# Patient Record
Sex: MALE | Race: WHITE | NOT HISPANIC OR LATINO | Employment: STUDENT | URBAN - METROPOLITAN AREA
[De-identification: names, ages, dates, MRNs, and addresses within clinical notes are randomized per-mention and may not be internally consistent; named-entity substitution may affect disease eponyms.]

---

## 2017-03-03 ENCOUNTER — TELEPHONE (OUTPATIENT)
Dept: ORTHOPEDICS | Facility: CLINIC | Age: 32
End: 2017-03-03

## 2017-03-03 ENCOUNTER — OFFICE VISIT (OUTPATIENT)
Dept: ORTHOPEDICS | Facility: CLINIC | Age: 32
End: 2017-03-03
Payer: COMMERCIAL

## 2017-03-03 ENCOUNTER — HOSPITAL ENCOUNTER (OUTPATIENT)
Dept: RADIOLOGY | Facility: HOSPITAL | Age: 32
Discharge: HOME | End: 2017-03-03
Attending: NURSE PRACTITIONER
Payer: COMMERCIAL

## 2017-03-03 VITALS
HEIGHT: 68 IN | WEIGHT: 195 LBS | SYSTOLIC BLOOD PRESSURE: 106 MMHG | BODY MASS INDEX: 29.55 KG/M2 | DIASTOLIC BLOOD PRESSURE: 70 MMHG | HEART RATE: 76 BPM

## 2017-03-03 DIAGNOSIS — M25.572 ACUTE LEFT ANKLE PAIN: Primary | ICD-10-CM

## 2017-03-03 DIAGNOSIS — S82.832A CLOSED FRACTURE OF DISTAL END OF LEFT FIBULA, UNSPECIFIED FRACTURE MORPHOLOGY, INITIAL ENCOUNTER: ICD-10-CM

## 2017-03-03 DIAGNOSIS — M25.572 ACUTE LEFT ANKLE PAIN: ICD-10-CM

## 2017-03-03 PROCEDURE — 1160F RVW MEDS BY RX/DR IN RCRD: CPT | Mod: S$GLB,,, | Performed by: NURSE PRACTITIONER

## 2017-03-03 PROCEDURE — 99999 PR PBB SHADOW E&M-EST. PATIENT-LVL III: CPT | Mod: PBBFAC,,, | Performed by: NURSE PRACTITIONER

## 2017-03-03 PROCEDURE — 73610 X-RAY EXAM OF ANKLE: CPT | Mod: TC,LT

## 2017-03-03 PROCEDURE — 73610 X-RAY EXAM OF ANKLE: CPT | Mod: 26,LT,, | Performed by: RADIOLOGY

## 2017-03-03 PROCEDURE — 99203 OFFICE O/P NEW LOW 30 MIN: CPT | Mod: S$GLB,,, | Performed by: NURSE PRACTITIONER

## 2017-03-03 RX ORDER — HYDROCODONE BITARTRATE AND ACETAMINOPHEN 5; 325 MG/1; MG/1
1 TABLET ORAL EVERY 6 HOURS PRN
Qty: 30 TABLET | Refills: 0 | Status: SHIPPED | OUTPATIENT
Start: 2017-03-03 | End: 2017-03-17 | Stop reason: SINTOL

## 2017-03-03 NOTE — TELEPHONE ENCOUNTER
Pt went to outside UC and was told he had a tib fib fx.   Pt scheduled with Penelope Mcdermott NP for evaluation and tx.   Pt has [email or web address] insurance.

## 2017-03-05 NOTE — PROGRESS NOTES
SUBJECTIVE:     Chief Complaint & History of Present Illness:  Nivia Zelaya is a 31 y.o. year old male who presents for evaluation of constant bilateral ankle pain which started 2 nights ago. There is a history of trauma he states he fell around 2AM and was seen at an urgent care who told him he had an ankle fracture and placed him in a walking boot.  The pain is located in the lateral aspect of the ankle.  The pain is described as sharp. He has been semi-weight bearing and using one crutch for ambulation. It is aggravated by direct pressure and walking. Denies any numbness or tingling. Associated symptoms include swelling.  There is not radiation into the foot.  Previous treatments include booting which have provided poor relief.  There is not a history of previous surgery to the ankle.  The patient does not use an assistive device.    Review of patient's allergies indicates:  No Known Allergies      Current Outpatient Prescriptions   Medication Sig Dispense Refill    finasteride (PROSCAR) 5 mg tablet TAKE ONE TABLET BY MOUTH ONCE DAILY 90 tablet 0    hydrocodone-acetaminophen 5-325mg (NORCO) 5-325 mg per tablet Take 1 tablet by mouth every 6 (six) hours as needed for Pain. 30 tablet 0     No current facility-administered medications for this visit.        History reviewed. No pertinent past medical history.    History reviewed. No pertinent surgical history.    Vital Signs (Most Recent)  Vitals:    03/03/17 1006   BP: 106/70   Pulse: 76       Review of Systems:  Review of Systems   Constitution: Negative for chills, decreased appetite, fever, weakness and malaise/fatigue.   Eyes: Negative for visual disturbance.   Cardiovascular: Negative for chest pain and palpitations.   Hematologic/Lymphatic: Negative for bleeding problem. Does not bruise/bleed easily.   Skin: Negative for color change, dry skin, flushing, itching, poor wound healing and rash.   Musculoskeletal: Positive for joint pain and joint  "swelling. Negative for arthritis, back pain, falls, gout, muscle cramps, muscle weakness, myalgias, neck pain and stiffness.   Neurological: Negative for dizziness, light-headedness, loss of balance, numbness and paresthesias.   Psychiatric/Behavioral: Negative for altered mental status.         OBJECTIVE:     PHYSICAL EXAM:  Height: 5' 8" (172.7 cm) Weight: 88.5 kg (195 lb)  General    Nursing note and vitals reviewed.  Constitutional: He is oriented to person, place, and time. He appears well-developed and well-nourished. No distress.   HENT:   Head: Atraumatic.   Nose: Nose normal.   Eyes: Conjunctivae and EOM are normal. Right eye exhibits no discharge. Left eye exhibits no discharge.   Cardiovascular: Normal rate.    Pulmonary/Chest: Effort normal. No respiratory distress.   Neurological: He is alert and oriented to person, place, and time. He has normal reflexes.   Psychiatric: He has a normal mood and affect. His behavior is normal. Judgment and thought content normal.         Left Ankle/Foot Exam     Inspection  Deformity: absent  Erythema: absent  Bruising: Ankle - absent Foot - absent  Effusion: Ankle - present Foot - absent    Pain   The patient exhibits pain of the lateral malleolus.    Alignment   Knee Alignment: neutral  Hindfoot Alignment: neutral  Midfoot Alignment: normal    Other   Sensation: normal    Comments:  Full ankle ROM not assess s/t acute fx  Pt able to move all toes normally       Vascular Exam       Left Pulses  Dorsalis Pedis:      2+  Posterior Tibial:      2+          Vitals:    03/03/17 1006   BP: 106/70   Pulse: 76       RADIOGRAPHS:  Xray of the left ankle showing oblique, nondisplaced two part distal fibula fracture    ASSESSMENT/PLAN:     Plan:  - Discussed case with Geeta Pandya distal fib fx  - Pt in short boot from urgent care D/c this and change to posterior short leg splint with stirrups  - Non weight bearing  - In 2 weeks if swelling reduced, change to short leg " cast continue non weight bearing x4 wk, then transition to tall walking boot   - Elevate  - RX written for pain medication, he states he refused at urgent care but it huts worse now, he understands he may feel fatigued on this medication and cannot drive while taking narcotic  - Pt in agreement with plan

## 2017-03-17 ENCOUNTER — OFFICE VISIT (OUTPATIENT)
Dept: ORTHOPEDICS | Facility: CLINIC | Age: 32
End: 2017-03-17
Payer: COMMERCIAL

## 2017-03-17 ENCOUNTER — HOSPITAL ENCOUNTER (OUTPATIENT)
Dept: RADIOLOGY | Facility: HOSPITAL | Age: 32
Discharge: HOME | End: 2017-03-17
Attending: NURSE PRACTITIONER
Payer: COMMERCIAL

## 2017-03-17 VITALS — BODY MASS INDEX: 29.57 KG/M2 | HEIGHT: 68 IN | WEIGHT: 195.13 LBS

## 2017-03-17 DIAGNOSIS — M25.572 LEFT ANKLE PAIN, UNSPECIFIED CHRONICITY: ICD-10-CM

## 2017-03-17 DIAGNOSIS — S82.832D CLOSED FRACTURE OF DISTAL END OF LEFT FIBULA WITH ROUTINE HEALING, UNSPECIFIED FRACTURE MORPHOLOGY, SUBSEQUENT ENCOUNTER: Primary | ICD-10-CM

## 2017-03-17 DIAGNOSIS — M25.572 LEFT ANKLE PAIN, UNSPECIFIED CHRONICITY: Primary | ICD-10-CM

## 2017-03-17 PROCEDURE — 73610 X-RAY EXAM OF ANKLE: CPT | Mod: 26,LT,, | Performed by: RADIOLOGY

## 2017-03-17 PROCEDURE — 99999 PR PBB SHADOW E&M-EST. PATIENT-LVL III: CPT | Mod: PBBFAC,,, | Performed by: NURSE PRACTITIONER

## 2017-03-17 PROCEDURE — 1160F RVW MEDS BY RX/DR IN RCRD: CPT | Mod: S$GLB,,, | Performed by: NURSE PRACTITIONER

## 2017-03-17 PROCEDURE — 99213 OFFICE O/P EST LOW 20 MIN: CPT | Mod: 25,S$GLB,, | Performed by: NURSE PRACTITIONER

## 2017-03-17 PROCEDURE — 29405 APPL SHORT LEG CAST: CPT | Mod: LT,S$GLB,, | Performed by: NURSE PRACTITIONER

## 2017-03-17 PROCEDURE — 73610 X-RAY EXAM OF ANKLE: CPT | Mod: TC,LT

## 2017-03-17 RX ORDER — PENICILLIN V POTASSIUM 500 MG/1
TABLET, FILM COATED ORAL
Refills: 0 | COMMUNITY
Start: 2017-03-10 | End: 2017-10-27

## 2017-03-17 RX ORDER — OXYCODONE AND ACETAMINOPHEN 5; 325 MG/1; MG/1
1 TABLET ORAL EVERY 6 HOURS PRN
Qty: 30 TABLET | Refills: 0 | Status: SHIPPED | OUTPATIENT
Start: 2017-03-17 | End: 2017-04-18 | Stop reason: ALTCHOICE

## 2017-03-17 NOTE — PROGRESS NOTES
SUBJECTIVE:     Chief Complaint & History of Present Illness:  Nivia Zelaya is a 31 y.o. year old male who presents for follow up of left ankle distal fib fracture 3/1/17. He was placed in posterior leg splint with stirrups at last visit after arriving to us in a short walking boot from urgent care. After splinting, he was non-weightbearing for last 2 weeks. He states he is doing well. He denies n/t, states occasional aching pain at the ankle. He asks if the Norco can cause tooth aches as he started having one soon after, is seeing a dentist and on abx. He states dentist is unsure of cause of toothache. He denies any other questions or concerns. He is taking Norco PRN.     Review of patient's allergies indicates:  No Known Allergies      Current Outpatient Prescriptions   Medication Sig Dispense Refill    finasteride (PROSCAR) 5 mg tablet TAKE ONE TABLET BY MOUTH ONCE DAILY 90 tablet 0    oxycodone-acetaminophen (PERCOCET) 5-325 mg per tablet Take 1 tablet by mouth every 6 (six) hours as needed for Pain. 30 tablet 0    penicillin v potassium (VEETID) 500 MG tablet TK 2 TS PO NOW THEN TK 1 T PO TID FOR DENTAL INFECTION TAKE ALL MEDICATION  0     No current facility-administered medications for this visit.        History reviewed. No pertinent past medical history.    History reviewed. No pertinent surgical history.    Vital Signs (Most Recent)  There were no vitals filed for this visit.    Review of Systems:  Review of Systems   Constitution: Negative for chills, decreased appetite, fever, weakness and malaise/fatigue.   Eyes: Negative for visual disturbance.   Cardiovascular: Negative for chest pain and palpitations.   Hematologic/Lymphatic: Negative for bleeding problem. Does not bruise/bleed easily.   Skin: Negative for color change, dry skin, flushing, itching, poor wound healing and rash.   Musculoskeletal: Positive for joint pain and joint swelling. Negative for arthritis, back pain, falls, gout,  "muscle cramps, muscle weakness, myalgias, neck pain and stiffness.   Neurological: Negative for dizziness, light-headedness, loss of balance, numbness and paresthesias.   Psychiatric/Behavioral: Negative for altered mental status.         OBJECTIVE:     PHYSICAL EXAM:  Height: 5' 8" (172.7 cm) Weight: 88.5 kg (195 lb 1.7 oz)  General    Nursing note and vitals reviewed.  Constitutional: He is oriented to person, place, and time. He appears well-developed and well-nourished. No distress.   HENT:   Head: Atraumatic.   Nose: Nose normal.   Eyes: Conjunctivae and EOM are normal. Right eye exhibits no discharge. Left eye exhibits no discharge.   Cardiovascular: Normal rate.    Pulmonary/Chest: Effort normal. No respiratory distress.   Neurological: He is alert and oriented to person, place, and time. He has normal reflexes.   Psychiatric: He has a normal mood and affect. His behavior is normal. Judgment and thought content normal.         Left Ankle/Foot Exam     Inspection  Deformity: absent  Erythema: absent  Bruising: Ankle - absent Foot - absent  Effusion: Ankle - present (Mild swelling, improved since last visit ) Foot - absent    Pain   The patient exhibits pain of the lateral malleolus.    Alignment   Knee Alignment: neutral  Hindfoot Alignment: neutral  Midfoot Alignment: normal    Other   Sensation: normal    Comments:  Full ankle ROM not assess s/t acute fx  Pt able to move all toes normally       Vascular Exam       Left Pulses  Dorsalis Pedis:      2+  Posterior Tibial:      2+          There were no vitals filed for this visit.    RADIOGRAPHS:  Xray of the left ankle showing healing oblique two part distal fibula fracture, stable alignment compared to previous film    ASSESSMENT/PLAN:     Plan:  - Discussed case with Geeta Pandya distal fib fx  - Splint removed. After no change in alignment confirmed on Xray, patient was placed in a short leg cast  - Short leg cast continue non weight bearing x4 wk, " then transition to tall walking boot. Pt states cast feels good, denies any loose or tight feeling. He is to call if he experienced any tightness or loosening feeling, cast gets wet, etc. Pt verb understanding of this.   - Rest, Elevate  - Norco changed to Perocet, I told him I have not experienced a patient having toothache with norco but we can change medication to see if this makes a difference. Patient would like to try this.   - Pt in agreement with plan

## 2017-04-17 ENCOUNTER — HOSPITAL ENCOUNTER (OUTPATIENT)
Dept: RADIOLOGY | Facility: HOSPITAL | Age: 32
Discharge: HOME | End: 2017-04-17
Attending: NURSE PRACTITIONER
Payer: COMMERCIAL

## 2017-04-17 ENCOUNTER — OFFICE VISIT (OUTPATIENT)
Dept: ORTHOPEDICS | Facility: CLINIC | Age: 32
End: 2017-04-17
Payer: COMMERCIAL

## 2017-04-17 VITALS — BODY MASS INDEX: 29.57 KG/M2 | HEIGHT: 68 IN | WEIGHT: 195.13 LBS

## 2017-04-17 DIAGNOSIS — M25.572 LEFT ANKLE PAIN, UNSPECIFIED CHRONICITY: ICD-10-CM

## 2017-04-17 DIAGNOSIS — S82.832D CLOSED FRACTURE OF DISTAL END OF LEFT FIBULA WITH ROUTINE HEALING, UNSPECIFIED FRACTURE MORPHOLOGY, SUBSEQUENT ENCOUNTER: Primary | ICD-10-CM

## 2017-04-17 DIAGNOSIS — M25.572 LEFT ANKLE PAIN, UNSPECIFIED CHRONICITY: Primary | ICD-10-CM

## 2017-04-17 PROCEDURE — 1160F RVW MEDS BY RX/DR IN RCRD: CPT | Mod: S$GLB,,, | Performed by: NURSE PRACTITIONER

## 2017-04-17 PROCEDURE — 99999 PR PBB SHADOW E&M-EST. PATIENT-LVL II: CPT | Mod: PBBFAC,,, | Performed by: NURSE PRACTITIONER

## 2017-04-17 PROCEDURE — 99213 OFFICE O/P EST LOW 20 MIN: CPT | Mod: S$GLB,,, | Performed by: NURSE PRACTITIONER

## 2017-04-17 PROCEDURE — 73610 X-RAY EXAM OF ANKLE: CPT | Mod: 26,LT,, | Performed by: RADIOLOGY

## 2017-04-17 PROCEDURE — 73610 X-RAY EXAM OF ANKLE: CPT | Mod: TC,LT

## 2017-04-18 NOTE — PROGRESS NOTES
"  SUBJECTIVE:     Chief Complaint & History of Present Illness:  Nivia Zelaya is a 31 y.o. year old male who presents for f/u following 3/1 fall resulting in a distal fib fracture. He was splinted x2 weeks, casted [id number] and is present today for eval. He has been non weight bearing. He states he feels great. Denies any pain. Denies numbness or tingling. Denies any other concerns or complaints.     Review of patient's allergies indicates:  No Known Allergies      Current Outpatient Prescriptions   Medication Sig Dispense Refill    finasteride (PROSCAR) 5 mg tablet TAKE ONE TABLET BY MOUTH ONCE DAILY 90 tablet 0    oxycodone-acetaminophen (PERCOCET) 5-325 mg per tablet Take 1 tablet by mouth every 6 (six) hours as needed for Pain. 30 tablet 0    penicillin v potassium (VEETID) 500 MG tablet TK 2 TS PO NOW THEN TK 1 T PO TID FOR DENTAL INFECTION TAKE ALL MEDICATION  0     No current facility-administered medications for this visit.        History reviewed. No pertinent past medical history.    History reviewed. No pertinent surgical history.    Vital Signs (Most Recent)  There were no vitals filed for this visit.    Review of Systems:  Review of Systems   Constitution: Negative for chills, decreased appetite, fever, weakness and malaise/fatigue.   Eyes: Negative for visual disturbance.   Cardiovascular: Negative for chest pain and palpitations.   Hematologic/Lymphatic: Negative for bleeding problem. Does not bruise/bleed easily.   Skin: Negative for color change, dry skin, flushing, itching, poor wound healing and rash.   Musculoskeletal: Negative for arthritis, back pain, falls, gout, joint pain, joint swelling, muscle cramps, muscle weakness, myalgias, neck pain and stiffness.   Neurological: Negative for dizziness, light-headedness, loss of balance, numbness and paresthesias.   Psychiatric/Behavioral: Negative for altered mental status.         OBJECTIVE:     PHYSICAL EXAM:  Height: 5' 8" (172.7 cm) Weight: " 88.5 kg (195 lb 1.7 oz)  General    Nursing note and vitals reviewed.  Constitutional: He is oriented to person, place, and time. He appears well-developed and well-nourished. No distress.   HENT:   Head: Atraumatic.   Nose: Nose normal.   Eyes: Conjunctivae and EOM are normal. Right eye exhibits no discharge. Left eye exhibits no discharge.   Cardiovascular: Normal rate.    Pulmonary/Chest: Effort normal. No respiratory distress.   Neurological: He is alert and oriented to person, place, and time. He has normal reflexes.   Psychiatric: He has a normal mood and affect. His behavior is normal. Judgment and thought content normal.         Left Ankle/Foot Exam     Inspection  Deformity: absent  Erythema: absent  Bruising: Ankle - absent Foot - absent  Effusion: Ankle - absent Foot - absent    Alignment   Knee Alignment: neutral  Hindfoot Alignment: neutral  Midfoot Alignment: normal    Other   Sensation: normal    Comments:  No pain to palpation of fracture site  ROM mildly limited s/t to stiffness   No pain with ROM  No swelling  No skin breakdown       Vascular Exam       Left Pulses  Dorsalis Pedis:      2+  Posterior Tibial:      2+          RADIOGRAPHS:  Xray of the left ankle showing  a healing oblique fracture of the lateral malleolus.  Alignment is anatomic.  Ankle mortise is symmetric.  Talar dome is maintained.    ASSESSMENT/PLAN:     Plan:  - Connor B distal fib fx  - Cast removed today, swelling resolved, no pain to fracture site  - Placed in tall CAM boot  - Start 2x toe touch WB, progress to WBAT  - Start gentle ROM as tolerated, he does have joint stiffness s/t to 6wk immobilization   - RTC 4 wk with new Xray

## 2017-05-15 ENCOUNTER — OFFICE VISIT (OUTPATIENT)
Dept: ORTHOPEDICS | Facility: CLINIC | Age: 32
End: 2017-05-15
Payer: COMMERCIAL

## 2017-05-15 ENCOUNTER — HOSPITAL ENCOUNTER (OUTPATIENT)
Dept: RADIOLOGY | Facility: HOSPITAL | Age: 32
Discharge: HOME | End: 2017-05-15
Attending: NURSE PRACTITIONER
Payer: COMMERCIAL

## 2017-05-15 VITALS — BODY MASS INDEX: 29.57 KG/M2 | WEIGHT: 195.13 LBS | HEIGHT: 68 IN

## 2017-05-15 DIAGNOSIS — S82.832D CLOSED FRACTURE OF DISTAL END OF LEFT FIBULA WITH ROUTINE HEALING, UNSPECIFIED FRACTURE MORPHOLOGY, SUBSEQUENT ENCOUNTER: Primary | ICD-10-CM

## 2017-05-15 DIAGNOSIS — S82.832D CLOSED FRACTURE OF DISTAL END OF LEFT FIBULA WITH ROUTINE HEALING, UNSPECIFIED FRACTURE MORPHOLOGY, SUBSEQUENT ENCOUNTER: ICD-10-CM

## 2017-05-15 PROCEDURE — 73610 X-RAY EXAM OF ANKLE: CPT | Mod: 26,LT,, | Performed by: RADIOLOGY

## 2017-05-15 PROCEDURE — 1160F RVW MEDS BY RX/DR IN RCRD: CPT | Mod: S$GLB,,, | Performed by: NURSE PRACTITIONER

## 2017-05-15 PROCEDURE — 73610 X-RAY EXAM OF ANKLE: CPT | Mod: TC,LT

## 2017-05-15 PROCEDURE — 99999 PR PBB SHADOW E&M-EST. PATIENT-LVL III: CPT | Mod: PBBFAC,,, | Performed by: NURSE PRACTITIONER

## 2017-05-15 PROCEDURE — 99213 OFFICE O/P EST LOW 20 MIN: CPT | Mod: S$GLB,,, | Performed by: NURSE PRACTITIONER

## 2017-05-15 RX ORDER — IBUPROFEN 800 MG/1
TABLET ORAL
Refills: 0 | COMMUNITY
Start: 2017-05-04 | End: 2017-10-27

## 2017-05-15 NOTE — PROGRESS NOTES
SUBJECTIVE:     Chief Complaint & History of Present Illness:  Nivia Zelaya is a 31 y.o. year old male who presents for f/u following 3//2017 fall resulting in a distal fib fracture. He was splinted x2 weeks, casted [id number], CAM boot x 4 wk and is present today for follow up. He has been full weight bearing in his boot. He states he feels great. States occasionally he feels a slight pain, but nothing bothersome. Denies numbness or tingling. Denies any other concerns or complaints. He denies any pain with ambulation in the CAM boot. He states he has worn it whenever he is walking, but not sleeping.     Review of patient's allergies indicates:  No Known Allergies      Current Outpatient Prescriptions   Medication Sig Dispense Refill    finasteride (PROSCAR) 5 mg tablet TAKE ONE TABLET BY MOUTH ONCE DAILY 90 tablet 0    penicillin v potassium (VEETID) 500 MG tablet TK 2 TS PO NOW THEN TK 1 T PO TID FOR DENTAL INFECTION TAKE ALL MEDICATION  0    ibuprofen (ADVIL,MOTRIN) 800 MG tablet TK 1 T PO Q 8 H PRN P  0     No current facility-administered medications for this visit.        History reviewed. No pertinent past medical history.    History reviewed. No pertinent surgical history.    Vital Signs (Most Recent)  There were no vitals filed for this visit.    Review of Systems:  Review of Systems   Constitution: Negative for chills, decreased appetite, fever, weakness and malaise/fatigue.   Eyes: Negative for visual disturbance.   Cardiovascular: Negative for chest pain and palpitations.   Hematologic/Lymphatic: Negative for bleeding problem. Does not bruise/bleed easily.   Skin: Negative for color change, dry skin, flushing, itching, poor wound healing and rash.   Musculoskeletal: Negative for arthritis, back pain, falls, gout, joint pain, joint swelling, muscle cramps, muscle weakness, myalgias, neck pain and stiffness.   Neurological: Negative for dizziness, light-headedness, loss of balance, numbness and  "paresthesias.   Psychiatric/Behavioral: Negative for altered mental status.         OBJECTIVE:     PHYSICAL EXAM:  Height: 5' 8" (172.7 cm) Weight: 88.5 kg (195 lb 1.7 oz)  General    Nursing note and vitals reviewed.  Constitutional: He is oriented to person, place, and time. He appears well-developed and well-nourished. No distress.   HENT:   Head: Atraumatic.   Nose: Nose normal.   Eyes: Conjunctivae and EOM are normal. Right eye exhibits no discharge. Left eye exhibits no discharge.   Cardiovascular: Normal rate.    Pulmonary/Chest: Effort normal. No respiratory distress.   Neurological: He is alert and oriented to person, place, and time. He has normal reflexes.   Psychiatric: He has a normal mood and affect. His behavior is normal. Judgment and thought content normal.         Left Ankle/Foot Exam     Inspection  Deformity: absent  Erythema: absent  Bruising: Ankle - absent Foot - absent  Effusion: Ankle - absent Foot - absent    Alignment   Knee Alignment: neutral  Hindfoot Alignment: neutral  Midfoot Alignment: normal    Muscle Strength   The patient has normal left ankle strength.    Other   Sensation: normal    Comments:  No pain to palpation of fracture site  ROM mildly limited s/t to stiffness   No pain with ROM  No swelling  No skin breakdown       Vascular Exam       Left Pulses  Dorsalis Pedis:      2+  Posterior Tibial:      2+          RADIOGRAPHS:  Xray of the left ankle showing  a healing oblique fracture of the lateral malleolus.  Alignment is anatomic.  Ankle mortise is symmetric.  Talar dome is maintained.    ASSESSMENT/PLAN:     Plan:  - Geeta ROJAS distal fib fx  - Pt progressing well, no pain to fracture site  - Pt may start to wean out of CAM boot  - Fitted for lace up ankle brace today, must wear with ambulation/standing x2 more weeks minimum  - Continue ROM of the ankle, discussed PT with patient, he states he does not or feel that he needs at this point  - RTC 6 wk with new Xray   - No pain " medication needed  - Pt will call clinic with any questions, concerns, new symptoms, etc.

## 2017-06-26 ENCOUNTER — HOSPITAL ENCOUNTER (OUTPATIENT)
Dept: RADIOLOGY | Facility: HOSPITAL | Age: 32
Discharge: HOME | End: 2017-06-26
Attending: NURSE PRACTITIONER
Payer: COMMERCIAL

## 2017-06-26 ENCOUNTER — OFFICE VISIT (OUTPATIENT)
Dept: ORTHOPEDICS | Facility: CLINIC | Age: 32
End: 2017-06-26
Payer: COMMERCIAL

## 2017-06-26 VITALS — HEIGHT: 68 IN | BODY MASS INDEX: 29.57 KG/M2 | WEIGHT: 195.13 LBS

## 2017-06-26 DIAGNOSIS — S82.832D CLOSED FRACTURE OF DISTAL END OF LEFT FIBULA WITH ROUTINE HEALING, UNSPECIFIED FRACTURE MORPHOLOGY, SUBSEQUENT ENCOUNTER: Primary | ICD-10-CM

## 2017-06-26 DIAGNOSIS — M25.572 LEFT ANKLE PAIN, UNSPECIFIED CHRONICITY: Primary | ICD-10-CM

## 2017-06-26 DIAGNOSIS — M25.572 LEFT ANKLE PAIN, UNSPECIFIED CHRONICITY: ICD-10-CM

## 2017-06-26 PROCEDURE — 99213 OFFICE O/P EST LOW 20 MIN: CPT | Mod: S$GLB,,, | Performed by: NURSE PRACTITIONER

## 2017-06-26 PROCEDURE — 73610 X-RAY EXAM OF ANKLE: CPT | Mod: 26,LT,, | Performed by: RADIOLOGY

## 2017-06-26 PROCEDURE — 99999 PR PBB SHADOW E&M-EST. PATIENT-LVL II: CPT | Mod: PBBFAC,,, | Performed by: NURSE PRACTITIONER

## 2017-06-26 PROCEDURE — 73610 X-RAY EXAM OF ANKLE: CPT | Mod: TC,LT

## 2017-06-26 NOTE — PROGRESS NOTES
SUBJECTIVE:     Chief Complaint & History of Present Illness:  Nivia Zelaya is a 31 y.o. year old male who presents for f/u following 3/1/2017 fall resulting in a distal fib fracture. He was splinted x2 weeks, casted [id number], CAM boot x 4 wk and is present today for follow up. He has been full weight bearing in his boot. He states he feels great. States occasionally he feels a slight pain with extreme extension of the ankle, but no limitation in terms of daily living. States he does not need or take anything for pain because he doesn't really have pain. He has weaned himself out of lace up ankle brace. Denies numbness or tingling. Denies any other concerns or complaints.     Review of patient's allergies indicates:  No Known Allergies      Current Outpatient Prescriptions   Medication Sig Dispense Refill    finasteride (PROSCAR) 5 mg tablet TAKE ONE TABLET BY MOUTH ONCE DAILY 90 tablet 0    ibuprofen (ADVIL,MOTRIN) 800 MG tablet TK 1 T PO Q 8 H PRN P  0    penicillin v potassium (VEETID) 500 MG tablet TK 2 TS PO NOW THEN TK 1 T PO TID FOR DENTAL INFECTION TAKE ALL MEDICATION  0     No current facility-administered medications for this visit.        History reviewed. No pertinent past medical history.    History reviewed. No pertinent surgical history.    Vital Signs (Most Recent)  There were no vitals filed for this visit.    Review of Systems:  Review of Systems   Constitution: Negative for chills, decreased appetite, fever, weakness and malaise/fatigue.   Eyes: Negative for visual disturbance.   Cardiovascular: Negative for chest pain and palpitations.   Hematologic/Lymphatic: Negative for bleeding problem. Does not bruise/bleed easily.   Skin: Negative for color change, dry skin, flushing, itching, poor wound healing and rash.   Musculoskeletal: Positive for joint pain. Negative for arthritis, back pain, falls, gout, joint swelling, muscle cramps, muscle weakness, myalgias, neck pain and stiffness.  "  Neurological: Negative for dizziness, light-headedness, loss of balance, numbness and paresthesias.   Psychiatric/Behavioral: Negative for altered mental status.         OBJECTIVE:     PHYSICAL EXAM:  Height: 5' 8" (172.7 cm) Weight: 88.5 kg (195 lb 1.7 oz)  General    Nursing note and vitals reviewed.  Constitutional: He is oriented to person, place, and time. He appears well-developed and well-nourished. No distress.   HENT:   Head: Atraumatic.   Nose: Nose normal.   Eyes: Conjunctivae and EOM are normal. Right eye exhibits no discharge. Left eye exhibits no discharge.   Cardiovascular: Normal rate.    Pulmonary/Chest: Effort normal. No respiratory distress.   Neurological: He is alert and oriented to person, place, and time. He has normal reflexes.   Psychiatric: He has a normal mood and affect. His behavior is normal. Judgment and thought content normal.         Left Ankle/Foot Exam     Inspection  Deformity: absent  Erythema: absent  Bruising: Ankle - absent Foot - absent  Effusion: Ankle - absent Foot - absent    Range of Motion   The patient has normal left ankle ROM.     Alignment   Knee Alignment: neutral  Hindfoot Alignment: neutral  Midfoot Alignment: normal    Muscle Strength   The patient has normal left ankle strength.    Other   Sensation: normal    Comments:  No pain to palpation of fracture site  No pain with ROM  No swelling  No skin breakdown       Vascular Exam       Left Pulses  Dorsalis Pedis:      2+  Posterior Tibial:      2+          RADIOGRAPHS:  Xray of the left ankle showing  a healing oblique fracture of the lateral malleolus.  Alignment is anatomic.  Ankle mortise is symmetric.  Talar dome is maintained.    ASSESSMENT/PLAN:     Plan:  Pt almost 4m s/p distal fib fracture.   - Connor B distal fib fx  - Scant pain, none to tenderness or walking- he states only when he pushes full extension in the ankle  - Pt ROM good, he states he does not want/need any PT  - He asks if he can start " jogging. He may however I recommend lace-up ankle brace for stability, and instructed him only jogging if no pain, jogging as tolerated. If he has pain with it- I do not recommend jogging.   - RTC 12 wk with new Xray prn  - No pain medication needed  - Pt will call clinic with any questions, concerns, new symptoms, etc.

## 2017-07-11 ENCOUNTER — TELEPHONE (OUTPATIENT)
Dept: URGENT CARE | Facility: CLINIC | Age: 32
End: 2017-07-11

## 2017-07-11 LAB
C TRACH RRNA SPEC QL NAA+PROBE: NEGATIVE
HIV 1+2 AB+HIV1 P24 AG SERPL QL IA: NON REACTIVE
N GONORRHOEA RRNA SPEC QL NAA+PROBE: NEGATIVE
RPR SER QL: NON REACTIVE

## 2017-07-12 ENCOUNTER — TELEPHONE (OUTPATIENT)
Dept: URGENT CARE | Facility: CLINIC | Age: 32
End: 2017-07-12

## 2017-07-12 NOTE — TELEPHONE ENCOUNTER
Spoke with patient again in regard to labs. Duplicate report printed. Apologized to patient. No questions at time of call. -lele

## 2017-07-15 ENCOUNTER — OFFICE VISIT (OUTPATIENT)
Dept: URGENT CARE | Facility: CLINIC | Age: 32
End: 2017-07-15

## 2017-07-15 VITALS
HEART RATE: 82 BPM | OXYGEN SATURATION: 100 % | WEIGHT: 195 LBS | HEIGHT: 68 IN | RESPIRATION RATE: 16 BRPM | SYSTOLIC BLOOD PRESSURE: 126 MMHG | DIASTOLIC BLOOD PRESSURE: 78 MMHG | BODY MASS INDEX: 29.55 KG/M2 | TEMPERATURE: 99 F

## 2017-07-15 DIAGNOSIS — S41.111D LACERATION OF ARM, RIGHT, SUBSEQUENT ENCOUNTER: Primary | ICD-10-CM

## 2017-07-15 PROCEDURE — 99024 POSTOP FOLLOW-UP VISIT: CPT | Mod: S$GLB,,, | Performed by: SURGERY

## 2017-07-15 NOTE — PATIENT INSTRUCTIONS
Extremity Laceration: Sutures, Staples, or Tape  A laceration is a cut through the skin. If it is deep, it may require stitches (sutures) or staples to close so it can heal. Minor cuts may be treated with surgical tape closures.   X-rays may be done if something may have entered the skin through the cut. You may also need a tetanus shot if you are not up to date on this vaccination.  Home care  · Follow the health care providers instructions on how to care for the cut.  · Wash your hands with soap and warm water before and after caring for your wound. This is to help prevent infection.  · Keep the wound clean and dry. If a bandage was applied and it becomes wet or dirty, replace it. Otherwise, leave it in place for the first 24 hours, then change it once a day or as directed.  · If sutures or staples were used, clean the wound daily:  · After removing the bandage, wash the area with soap and water. Use a wet cotton swab to loosen and remove any blood or crust that forms.  · After cleaning, keep the wound clean and dry. Talk with your doctor before applying any antibiotic ointment to the wound. Reapply the bandage.  · You may remove the bandage to shower as usual after the first 24 hours, but do not soak the area in water (no swimming) until the stitches or staples are removed.  · If surgical tape closures were used, keep the area clean and dry. If it becomes wet, blot it dry with a towel.  · The doctor may prescribe an antibiotic cream or ointment to prevent infection. Do not stop taking this medication until you have finished the prescribed course or the doctor tells you to stop. The doctor may also prescribe medications for pain. Follow the doctors instructions for taking these medications.  · Avoid activities that may reopen your wound.  Follow-up care  Follow up with your health care provider. Most skin wounds heal within ten days. However, an infection may sometimes occur despite proper treatment.  Therefore, check the wound daily for the signs of infection listed below. Stitches and staples should be removed within 7-14 days. If surgical tape closures were used, you may remove them after 10 days if they have not fallen off by then.   When to seek medical advice  Call your health care provider right away if any of these occur:  · Wound bleeding not controlled by direct pressure  · Signs of infection, including increasing pain in the wound, increasing wound redness or swelling, or pus or bad odor coming from the wound  · Fever of 100.4°F (38ºC) or higher or as directed by your healthcare provider  · Stitches or staples come apart or fall out or surgical tape falls off before 7 days  · Wound edges re-open  · Wound changes colors  · Numbness around the wound   · Decreased movement around the injured area  Date Last Reviewed: 6/14/2015  © 7537-7131 [employer]. [address] [place] [address]. All rights reserved. This information is not intended as a substitute for professional medical care. Always follow your healthcare professional's instructions.        Laceration: How to Minimize Scarring  A laceration is a cut through one or more layers of the skin. Cuts heal as the edges of the cut grow together and heal. After the cut heals, the skin may not look exactly the same. The gregorio left behind is called a scar. Scars are a natural part of the healing process. They are hard to avoid. How much you may scar depends on the depth of the cut, its location on your body, your age, and how your skin heals. Some people tend to heal with more scarring than others. Most scars fade and become less noticeable over time. You can take steps to help this process along.  NOTE: Please inform the staff of your last tetanus shot and if your wound was caused by a dionicio or dirty object.  What you can do  The tips below can help reduce scarring as your wound heals.  · Keep the wound clean. Unless you are told to  keep the area dry, gently wash the area with mild soap and water. You don't need to use antibacterial soap.   · Keep the wound moist. Apply petroleum jelly to the wound to keep it moist and prevent a scab from forming. Scabs lengthen the time it takes a wound to heal.  · Cover the wound. Use a non-adhesive bandage or gauze pad with paper tape. Change the bandage daily or if it gets wet or dirty.  · Try hydrating or silicone gel sheets. These dressings keep the wound moist and may help it heal faster with less scarring. They may be useful for larger wounds, scrapes, sores, burns, or wounds with persistent redness. Ask your healthcare provider whether you should use this product on your wound.  · If you have stitches (sutures), follow your healthcare provider's instructions. Care for the wound as instructed. Also, return to have stitches removed on time. If you wait, scarring might be worse.  · Use sunscreen. Once the wound heals, apply sunscreen to the area daily. Sun may cause the scar to be more visible and discolored.  Follow up  Make a follow-up appointment as directed by our staff. If you are advised to see a specialist or you have concerns about scarring, please contact a dermatologist.  When to seek medical advice  When to seek medical advice  · Call your health care provider right away if any of these occur:  · Shaking chills or fever above 100.4°F (38°C)  · Bleeding that soaks the dressing  · Pink fluid weeping from the wound  · Increased drainage from the wound or drainage that is yellow, yellow-green, or has a foul odor  · Increased swelling, pain, or redness in the skin around the wound  · A change in the color or size of the wound  · Increased fatigue  · Loss of appetite  · Sutures pulling away from the wound or pulling apart  Date Last Reviewed: 11/15/2015  © 5236-8227 [employer]. [address] [place] [address], [place] [address]. All rights reserved. This information is not intended as a  substitute for professional medical care. Always follow your healthcare professional's instructions.

## 2017-07-15 NOTE — PROCEDURES
Suture Removal  Date/Time: 7/15/2017 1:15 PM  Performed by: JOHN MADRID  Authorized by: JOHN MADRID   Body area: upper extremity  Location details: right upper arm  Wound Appearance: well healed  Post-removal: bandaid applied and Steri-Strips applied  Facility: sutures placed in this facility  Complications: No  Specimens: No  Implants: No  Patient tolerance: Patient tolerated the procedure well with no immediate complications    Suture Removal  Date/Time: 7/15/2017 1:17 PM  Performed by: JOHN MADRID  Authorized by: JOHN MADRID   Body area: lower extremity  Location details: right lower leg  Wound Appearance: clean and well healed  Post-removal: Steri-Strips applied  Facility: sutures placed in this facility  Complications: No  Specimens: No  Implants: No  Patient tolerance: Patient tolerated the procedure well with no immediate complications

## 2017-07-15 NOTE — PROGRESS NOTES
Subjective:       Patient ID: Nivia Zelaya is a 31 y.o. male.    Chief Complaint: Suture / Staple Removal (Placed 1 week ago. R knee and arm lacerations from broken glass.)    Suture / Staple Removal   The sutures were placed 7 to 10 days ago. He tried antibiotic ointment use since the wound repair. The treatment provided significant relief. The maximum temperature noted was less than 100.4 F. There has been no drainage from the wound. The redness has not changed. There is no swelling present. The pain has no pain.     Review of Systems   Constitution: Negative for fever.   Eyes: Negative for discharge.       Objective:      Physical Exam   Skin: Laceration noted.            Assessment:       1. Laceration of arm, right, subsequent encounter        Plan:         Laceration of arm, right, subsequent encounter  -     SUTURE REMOVAL  -     SUTURE REMOVAL

## 2017-10-27 ENCOUNTER — OFFICE VISIT (OUTPATIENT)
Dept: DERMATOLOGY | Facility: CLINIC | Age: 32
End: 2017-10-27
Payer: COMMERCIAL

## 2017-10-27 DIAGNOSIS — L21.9 SEBORRHEIC DERMATITIS: Primary | ICD-10-CM

## 2017-10-27 DIAGNOSIS — L60.3 DYSTROPHIC NAIL: ICD-10-CM

## 2017-10-27 DIAGNOSIS — B07.8 COMMON WART: ICD-10-CM

## 2017-10-27 PROCEDURE — 99202 OFFICE O/P NEW SF 15 MIN: CPT | Mod: 25,S$GLB,, | Performed by: DERMATOLOGY

## 2017-10-27 PROCEDURE — 99999 PR PBB SHADOW E&M-EST. PATIENT-LVL II: CPT | Mod: PBBFAC,,, | Performed by: DERMATOLOGY

## 2017-10-27 PROCEDURE — 17110 DESTRUCTION B9 LES UP TO 14: CPT | Mod: S$GLB,,, | Performed by: DERMATOLOGY

## 2017-10-27 RX ORDER — FLUOCINOLONE ACETONIDE 0.1 MG/ML
SOLUTION TOPICAL 2 TIMES DAILY
Qty: 90 ML | Refills: 1 | Status: SHIPPED | OUTPATIENT
Start: 2017-10-27

## 2017-10-27 RX ORDER — CICLOPIROX 80 MG/ML
SOLUTION TOPICAL
Qty: 1 BOTTLE | Refills: 5 | Status: SHIPPED | OUTPATIENT
Start: 2017-10-27

## 2017-10-27 NOTE — PROGRESS NOTES
Subjective:       Patient ID:  Nivia Zelaya is a 32 y.o. male who presents for   Chief Complaint   Patient presents with    Seborrheic Dermatitis     face & scalp,    Warts     R posterior knee    Fungus     L toenail     Seborrheic Dermatitis  - Initial  Affected locations: scalp and face  Signs / symptoms: itching  Treatments tried: keto shampoo.  Improvement on treatment: no relief    Warts  - Initial  Affected locations: right knee  Signs and Symptoms: not healing.  Treatments tried: wart be gone.  Improvement on treatment: no relief    Fungus  - Initial  Affected locations: left toes  Signs / symptoms: asymptomatic  Relieving factors/Treatments tried: OTC antifungals  Improvement on treatment: no relief      History reviewed. No pertinent past medical history.    Review of Systems   Constitutional: Negative for fever, chills, weight loss, weight gain, fatigue and malaise.   Skin: Positive for activity-related sunscreen use. Negative for daily sunscreen use and recent sunburn.   Hematologic/Lymphatic: Does not bruise/bleed easily.        Objective:    Physical Exam   Constitutional: He appears well-developed and well-nourished. No distress.   Neurological: He is alert and oriented to person, place, and time. He is not disoriented.   Psychiatric: He has a normal mood and affect.   Skin:   Areas Examined (abnormalities noted in diagram):   Scalp / Hair Palpated and Inspected  Head / Face Inspection Performed  Neck Inspection Performed  RUE Inspected  LUE Inspection Performed  RLE Inspected  LLE Inspection Performed  Nails and Digits Inspection Performed                       Diagram Legend     Erythematous scaling macule/papule c/w actinic keratosis       Vascular papule c/w angioma      Pigmented verrucoid papule/plaque c/w seborrheic keratosis      Yellow umbilicated papule c/w sebaceous hyperplasia      Irregularly shaped tan macule c/w lentigo     1-2 mm smooth white papules consistent with Milia       Movable subcutaneous cyst with punctum c/w epidermal inclusion cyst      Subcutaneous movable cyst c/w pilar cyst      Firm pink to brown papule c/w dermatofibroma      Pedunculated fleshy papule(s) c/w skin tag(s)      Evenly pigmented macule c/w junctional nevus     Mildly variegated pigmented, slightly irregular-bordered macule c/w mildly atypical nevus      Flesh colored to evenly pigmented papule c/w intradermal nevus       Pink pearly papule/plaque c/w basal cell carcinoma      Erythematous hyperkeratotic cursted plaque c/w SCC      Surgical scar with no sign of skin cancer recurrence      Open and closed comedones      Inflammatory papules and pustules      Verrucoid papule consistent consistent with wart     Erythematous eczematous patches and plaques     Dystrophic onycholytic nail with subungual debris c/w onychomycosis     Umbilicated papule    Erythematous-base heme-crusted tan verrucoid plaque consistent with inflamed seborrheic keratosis     Erythematous Silvery Scaling Plaque c/w Psoriasis     See annotation      Assessment / Plan:        Seborrheic dermatitis  Continue ketoconazole shampoo.  Ok to alternate with Head and Shoulders or Selsun Blue  -     fluocinolone (SYNALAR) 0.01 % external solution; Apply topically 2 (two) times daily.  Dispense: 90 mL; Refill: 1    Dystrophic nail  -     ciclopirox (PENLAC) 8 % Soln; Apply daily to affected nail. Must remove and restart weekly  Dispense: 1 Bottle; Refill: 5    Common wart  Cryosurgery procedure note:    Verbal consent from the patient is obtained. Liquid nitrogen cryosurgery is applied to 1 verruca, as detailed in the physical exam, to produce a freeze injury. 2 consecutive freeze thaw cycles are applied to each verruca. The patient is aware that blisters (possibly blood blisters) may form.           Return in about 4 weeks (around 11/24/2017).

## 2018-11-30 ENCOUNTER — HOSPITAL ENCOUNTER (EMERGENCY)
Facility: HOSPITAL | Age: 33
Discharge: HOME | End: 2018-11-30
Attending: EMERGENCY MEDICINE
Payer: COMMERCIAL

## 2018-11-30 VITALS
OXYGEN SATURATION: 97 % | SYSTOLIC BLOOD PRESSURE: 122 MMHG | TEMPERATURE: 99 F | WEIGHT: 200 LBS | BODY MASS INDEX: 30.31 KG/M2 | RESPIRATION RATE: 18 BRPM | HEART RATE: 99 BPM | DIASTOLIC BLOOD PRESSURE: 83 MMHG | HEIGHT: 68 IN

## 2018-11-30 DIAGNOSIS — M25.571 RIGHT ANKLE PAIN: ICD-10-CM

## 2018-11-30 DIAGNOSIS — M25.579 ANKLE PAIN: ICD-10-CM

## 2018-11-30 DIAGNOSIS — S93.401A SPRAIN OF RIGHT ANKLE, UNSPECIFIED LIGAMENT, INITIAL ENCOUNTER: Primary | ICD-10-CM

## 2018-11-30 PROCEDURE — 99283 EMERGENCY DEPT VISIT LOW MDM: CPT | Mod: 25

## 2018-11-30 PROCEDURE — 99284 EMERGENCY DEPT VISIT MOD MDM: CPT | Mod: ,,, | Performed by: EMERGENCY MEDICINE

## 2018-11-30 NOTE — ED PROVIDER NOTES
Encounter Date: 11/30/2018    SCRIBE #1 NOTE: I, Price Krishnan, am scribing for, and in the presence of,  Juany Dillard MD. I have scribed the entire note.       History     Chief Complaint   Patient presents with    Ankle Pain     pt reports R ankle pain after wrestling around 9 pm     Pt is a 33 y.o. M presents to the ED for evaluation of ankle pain. States that he was wrestling and got thrown at 8:30pm landing on his foot then inverting at that ankle.  He felt a pop followed by difficulty walking. Pain was initially a 10/10, but now rates it at a 4/10 located on the outside of the ankle. Now there is mild swelling.  Denies pain medication prior to arrival.  Pt denies LOC, head injury, chest pain, SOB          Review of patient's allergies indicates:  No Known Allergies  No past medical history on file.  No past surgical history on file.  No family history on file.  Social History     Tobacco Use    Smoking status: Never Smoker    Smokeless tobacco: Never Used   Substance Use Topics    Alcohol use: No    Drug use: No     Review of Systems   Constitutional: Negative for chills, diaphoresis and fever.   Respiratory: Negative for cough and shortness of breath.    Cardiovascular: Negative for chest pain.   Gastrointestinal: Negative for abdominal pain, diarrhea, nausea and vomiting.   Musculoskeletal:        +ankle swelling        Physical Exam     Initial Vitals [11/30/18 0030]   BP Pulse Resp Temp SpO2   122/83 99 18 98.5 °F (36.9 °C) 97 %      MAP       --         Physical Exam    Constitutional: He appears well-developed and well-nourished.   HENT:   Head: Normocephalic and atraumatic.   Mouth/Throat: Oropharynx is clear and moist.   Neck: Normal range of motion. Neck supple. No JVD present.   Cardiovascular: Normal rate, regular rhythm, normal heart sounds and intact distal pulses. Exam reveals no gallop and no friction rub.    No murmur heard.  Pulmonary/Chest: Breath sounds normal. No respiratory distress.  He has no wheezes. He has no rhonchi. He has no rales.   Abdominal: Soft. He exhibits no distension. There is no tenderness. There is no rebound and no guarding.   Musculoskeletal:        Right ankle: He exhibits decreased range of motion and swelling. He exhibits no ecchymosis and no deformity. Tenderness. Lateral malleolus tenderness found. No proximal fibula tenderness found. Achilles tendon normal. Achilles tendon exhibits no pain, no defect and normal Jimenez's test results.   Lymphadenopathy:     He has no cervical adenopathy.   Neurological: He is alert and oriented to person, place, and time.   Skin: Skin is warm and dry.         ED Course   Procedures  Labs Reviewed - No data to display       Imaging Results          X-Ray Ankle Complete Right (Final result)  Result time 11/30/18 01:21:17    Final result by Willam Torres MD (11/30/18 01:21:17)                 Impression:      Mild soft tissue edema about the ankle.  No acute bony abnormality.      Electronically signed by: Willam Torres MD  Date:    11/30/2018  Time:    01:21             Narrative:    EXAMINATION:  XR TIBIA FIBULA 2 VIEW RIGHT; XR ANKLE COMPLETE 3 VIEW RIGHT    CLINICAL HISTORY:  Pain in right ankle and joints of right foot; Pain in unspecified ankle and joints of unspecified foot    TECHNIQUE:  Two views of the right tibia and fibula were performed.  Three views of the right ankle were performed.    COMPARISON:  None.    FINDINGS:  No evidence of acute fracture or dislocation.  There is mild soft tissue edema about the ankle, more pronounced over the lateral malleolus.  No radiopaque foreign body.                               X-Ray Tibia Fibula 2 View Right (Final result)  Result time 11/30/18 01:21:17    Final result by Willam Torres MD (11/30/18 01:21:17)                 Impression:      Mild soft tissue edema about the ankle.  No acute bony abnormality.      Electronically signed by: Willam Torres  MD  Date:    11/30/2018  Time:    01:21             Narrative:    EXAMINATION:  XR TIBIA FIBULA 2 VIEW RIGHT; XR ANKLE COMPLETE 3 VIEW RIGHT    CLINICAL HISTORY:  Pain in right ankle and joints of right foot; Pain in unspecified ankle and joints of unspecified foot    TECHNIQUE:  Two views of the right tibia and fibula were performed.  Three views of the right ankle were performed.    COMPARISON:  None.    FINDINGS:  No evidence of acute fracture or dislocation.  There is mild soft tissue edema about the ankle, more pronounced over the lateral malleolus.  No radiopaque foreign body.                                 Medical Decision Making:   History:   Old Medical Records: I decided to obtain old medical records.  Initial Assessment:   Urgent evaluation of 33 y.o. M presenting with right ankle pain.  Differential Diagnosis:   Sprain, contusion, fracture   Clinical Tests:   Radiological Study: Reviewed and Ordered  ED Management:  Pt well appearing in no acute distress. Concern is for fracture vs sprain of right ankle. Xrays ordered.     Xray reviewed, no acute fracture.  Pt ace wrapped and place in a air splint.  He has crutches at home.  Recommend tylenol/ibuprofen for pain.  RICE therapy, ortho follow up in not improved in 2 weeks.            Scribe Attestation:   Scribe #1: I performed the above scribed service and the documentation accurately describes the services I performed. I attest to the accuracy of the note.               Clinical Impression:   The primary encounter diagnosis was Sprain of right ankle, unspecified ligament, initial encounter. Diagnoses of Ankle pain and Right ankle pain were also pertinent to this visit.      Disposition:   Disposition: Discharged  Condition: Stable                        Juany Dillard MD  12/01/18 2651

## 2019-10-14 ENCOUNTER — OFFICE VISIT (OUTPATIENT)
Dept: ALLERGY | Facility: CLINIC | Age: 34
End: 2019-10-14
Payer: COMMERCIAL

## 2019-10-14 VITALS
OXYGEN SATURATION: 98 % | HEART RATE: 73 BPM | WEIGHT: 203.06 LBS | SYSTOLIC BLOOD PRESSURE: 120 MMHG | DIASTOLIC BLOOD PRESSURE: 88 MMHG | BODY MASS INDEX: 30.08 KG/M2 | HEIGHT: 69 IN

## 2019-10-14 DIAGNOSIS — R05.9 COUGH: ICD-10-CM

## 2019-10-14 DIAGNOSIS — J31.0 CHRONIC RHINITIS: ICD-10-CM

## 2019-10-14 DIAGNOSIS — J31.0 CHRONIC RHINITIS: Primary | ICD-10-CM

## 2019-10-14 PROCEDURE — 3008F BODY MASS INDEX DOCD: CPT | Mod: CPTII,S$GLB,, | Performed by: STUDENT IN AN ORGANIZED HEALTH CARE EDUCATION/TRAINING PROGRAM

## 2019-10-14 PROCEDURE — 99204 OFFICE O/P NEW MOD 45 MIN: CPT | Mod: S$GLB,,, | Performed by: STUDENT IN AN ORGANIZED HEALTH CARE EDUCATION/TRAINING PROGRAM

## 2019-10-14 PROCEDURE — 99204 PR OFFICE/OUTPT VISIT, NEW, LEVL IV, 45-59 MIN: ICD-10-PCS | Mod: S$GLB,,, | Performed by: STUDENT IN AN ORGANIZED HEALTH CARE EDUCATION/TRAINING PROGRAM

## 2019-10-14 PROCEDURE — 3008F PR BODY MASS INDEX (BMI) DOCUMENTED: ICD-10-PCS | Mod: CPTII,S$GLB,, | Performed by: STUDENT IN AN ORGANIZED HEALTH CARE EDUCATION/TRAINING PROGRAM

## 2019-10-14 PROCEDURE — 99999 PR PBB SHADOW E&M-EST. PATIENT-LVL III: CPT | Mod: PBBFAC,,, | Performed by: STUDENT IN AN ORGANIZED HEALTH CARE EDUCATION/TRAINING PROGRAM

## 2019-10-14 PROCEDURE — 99999 PR PBB SHADOW E&M-EST. PATIENT-LVL III: ICD-10-PCS | Mod: PBBFAC,,, | Performed by: STUDENT IN AN ORGANIZED HEALTH CARE EDUCATION/TRAINING PROGRAM

## 2019-10-14 RX ORDER — AZELASTINE 1 MG/ML
2 SPRAY, METERED NASAL 2 TIMES DAILY PRN
Qty: 30 ML | Refills: 11 | Status: SHIPPED | OUTPATIENT
Start: 2019-10-14 | End: 2019-10-14 | Stop reason: SDUPTHER

## 2019-10-14 RX ORDER — AZELASTINE 1 MG/ML
2 SPRAY, METERED NASAL 2 TIMES DAILY PRN
Qty: 30 ML | Refills: 11 | Status: SHIPPED | OUTPATIENT
Start: 2019-10-14 | End: 2020-10-13

## 2019-10-14 NOTE — PROGRESS NOTES
Allergy Clinic Note  [facility]    Subjective:      Patient ID: Nivia Zelaya is a 34 y.o. male.    Chief Complaint: Other (cough x1 month, worse before bedtime.  Feels like something is stuck in back of throat, brings up clear phlegm)      Referring Provider: Self, Aaareferral    History of Present Illness:  34-year-old male presents for evaluation of cough off and on since he was a teenager.  His current cough is been present for a month.  He describes it at the level of his throat.  Associated symptoms include throat clearing and a sensation of postnasal drip.  He denies any wheezing, shortness of breath, or chest tightness.  He says it is worse in the evening and in the early morning.  It is a little bit throughout the day, rather than in spells.  It is productive of clear thin mucus.  He denies any bad taste in his mouth.  He says sometimes he notes is a decrease of sense of smell or taste.  Precipitants include cat and dogs.  Symptoms are perennial without seasonal variation.  He has found good relief from Zyrtec but dislikes taking meds.  He has an electronic nasal rinse machine which she uses occasionally.    Additional History:   Past medical history is unremarkable.  He has no history of hypertension or heart disease..  No Hx of surgery.  Family history is unremarkable.  Client  reports that he has never smoked. He has never used smokeless tobacco.  Exposures are notable for a dog at his parents house and she shares washing machine and drier with neighbors who have pets.  No exposure to cats.     Patient Active Problem List   Diagnosis    Bilateral impacted cerumen     Current Outpatient Medications on File Prior to Visit   Medication Sig Dispense Refill    finasteride (PROSCAR) 5 mg tablet TAKE 1 TABLET BY MOUTH ONCE DAILY 90 tablet 0    ciclopirox (PENLAC) 8 % Soln Apply daily to affected nail. Must remove and restart weekly 1 Bottle 5    fluocinolone (SYNALAR) 0.01 % external  "solution Apply topically 2 (two) times daily. 90 mL 1     No current facility-administered medications on file prior to visit.          Review of Systems   Constitutional: Negative for chills and fever.   HENT: Positive for congestion. Negative for ear discharge and nosebleeds.    Eyes: Negative for discharge and redness.   Respiratory: Positive for cough. Negative for hemoptysis, sputum production, shortness of breath, wheezing and stridor.    Cardiovascular: Negative for chest pain and palpitations.   Gastrointestinal: Negative for blood in stool, melena and vomiting.   Genitourinary: Negative for hematuria.   Skin: Negative for itching and rash.   Neurological: Negative for seizures and loss of consciousness.       Objective:   /88   Pulse 73   Ht 5' 9" (1.753 m)   Wt 92.1 kg (203 lb 0.7 oz)   SpO2 98%   BMI 29.98 kg/m²       Physical Exam   Constitutional: He is oriented to person, place, and time and well-developed, well-nourished, and in no distress.   HENT:   Head: Normocephalic and atraumatic.   Nose: Nose normal.   Mouth/Throat: No oropharyngeal exudate.   Nares pink with moderate turbinates swelling.  Oropharynx benign without exudate.  Tongue is not coated.   Eyes: Conjunctivae are normal. Right eye exhibits no discharge. Left eye exhibits no discharge.   Neck: Neck supple.   Cardiovascular: Normal rate, regular rhythm, normal heart sounds and intact distal pulses.   Pulmonary/Chest: Effort normal and breath sounds normal. No stridor. No respiratory distress. He has no wheezes.   Abdominal: Soft. He exhibits no distension. There is no tenderness.   Musculoskeletal: He exhibits no edema or deformity.   Lymphadenopathy:     He has no cervical adenopathy.   Neurological: He is alert and oriented to person, place, and time.   Skin: No rash noted. No erythema.   Psychiatric: Memory, affect and judgment normal.       Data:   None    Assessment:     1. Chronic rhinitis    2. Cough        Plan: "     Medical decision making:  Client is presenting with cough at the level of his throat.  Differential is postnasal drip versus GERD/LPR.  I am not suspicious of any cardiopulmonary etiology. Based on his improvement with Zyrtec and his associated nasal congestion, postnasal drip seems somewhat more likely.  Since he dislikes medications, I initially offered him nasal rinse.  He ended up opting for topical antihistamine on a p.r.n. basis.        Will need to keep GERD/LPR high on the differential with a low threshold for ENT referral to consider laryngoscopy.    Nivia was seen today for other.    Diagnoses and all orders for this visit:    Chronic rhinitis  -     Discontinue: sod chlor-bicarb-squeez bottle (NEILMED SINUS RINSE COMPLETE) pkdv; Use whenever you feel mucous in your nose or sinuses  -     IgE; Future  -     Dermatophagoides [place]; Future  -     Dermatophagoides Pteronyssinus; Future  -     [place]; Future  -     [place]; Future  -     [place]; Future  -     English Plantain; Future  -     [place]ak Pecan; Future  -     Pecan; Future  -     [place]; Future  -     Ragweed; Future  -     Alternaria; Future  -     Aspergillus; Future  -     Cat; Future  -     Cockroach; Future  -     Dog; Future  -     azelastine (ASTELIN) 137 mcg (0.1 %) nasal spray; 2 sprays (274 mcg total) by Nasal route 2 (two) times daily as needed for Rhinitis. Donot snort (because it tastes bad)    Cough  -     azelastine (ASTELIN) 137 mcg (0.1 %) nasal spray; 2 sprays (274 mcg total) by Nasal route 2 (two) times daily as needed for Rhinitis. Donot snort (because it tastes bad)    low threshold for laryngoscopy    Patient Instructions   Testing  Blood work for allergy testing today       Check [facility] in one week for results or call [phone]       Contact me with questions or concerns       I will contact you if anything needs immediate attention.        Treatment    Astelin = azelastine nasal spray:    2 squirts each nostril as  needed, up to twice a day   Do not snort (because it burns and tastes bad)          Follow up in about 2 weeks (around 10/28/2019).    Penelope Rich MD

## 2019-10-14 NOTE — PATIENT INSTRUCTIONS
Testing  Blood work for allergy testing today       Check [facility] in one week for results or call [phone]       Contact me with questions or concerns       I will contact you if anything needs immediate attention.        Treatment    Astelin = azelastine nasal spray:    2 squirts each nostril as needed, up to twice a day   Do not snort (because it burns and tastes bad)

## 2019-10-23 NOTE — PROGRESS NOTES
Allergy Clinic Note  [facility]    Subjective:      Patient ID: Nivia Zelaya is a 34 y.o. male.    Chief Complaint: Other (rhinorrhea gone, cough is much better.)      Allergy Problems List:     Cough @ throat    History of Present Illness:  34-year-old male with cough at the level of his throat returns to 1) follow up symptoms since starting Astelin; 2) follow up test results; and 3) discuss possible laryngoscopy looking for LPR.    Related medications     Zyrtec 10 daily    Client reports his rhinorrhea has resolved and his cough is significantly improved.  He is taking Zyrtec on a daily basis and not taking the Astelin.  No side effects or other problems from the medicines.  He is satisfied with his current level of symptom control.    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    No new problems or complaints    At his initial visit he reported cough off and on since he was a teenager.  At that time his cough had been present for a month.  He describes it at the level of his throat.  Associated symptoms include throat clearing and a sensation of postnasal drip.  He denies any wheezing, shortness of breath, or chest tightness.  He says it is worse in the evening and in the early morning.  It is a little bit throughout the day, rather than in spells.  It is productive of clear thin mucus.  He denies any bad taste in his mouth.  He says sometimes he notes is a decrease of sense of smell or taste.  Precipitants include cat and dogs.  Symptoms are perennial without seasonal variation.  He has found good relief from Zyrtec but dislikes taking meds.  He has an electronic nasal rinse machine which he uses occasionally.    Additional History:   Interval history is unremarkable Past medical history is unremarkable.  He has no history of hypertension or heart  "disease..  No Hx of surgery.  Family history is unremarkable.  Client  reports that he has never smoked. He has never used smokeless tobacco.  Exposures are notable for a dog at his parents house and she shares washing machine and drier with neighbors who have pets.  No exposure to cats.  Past medical, social, and family history is are unchanged.    Patient Active Problem List   Diagnosis    Bilateral impacted cerumen     Current Outpatient Medications on File Prior to Visit   Medication Sig Dispense Refill    azelastine (ASTELIN) 137 mcg (0.1 %) nasal spray 2 sprays (274 mcg total) by Nasal route 2 (two) times daily as needed for Rhinitis. Donot snort (because it tastes bad) 30 mL 11    finasteride (PROSCAR) 5 mg tablet TAKE 1 TABLET BY MOUTH ONCE DAILY 90 tablet 0    ciclopirox (PENLAC) 8 % Soln Apply daily to affected nail. Must remove and restart weekly (Patient not taking: Reported on 10/31/2019) 1 Bottle 5    fluocinolone (SYNALAR) 0.01 % external solution Apply topically 2 (two) times daily. (Patient not taking: Reported on 10/31/2019) 90 mL 1     No current facility-administered medications on file prior to visit.          Review of Systems   Constitutional: Negative for chills and fever.   HENT: Negative for ear discharge and nosebleeds.    Eyes: Negative for discharge and redness.   Respiratory: Positive for cough (Much improved). Negative for hemoptysis, sputum production, shortness of breath, wheezing and stridor.    Cardiovascular: Negative for chest pain and palpitations.   Gastrointestinal: Negative for blood in stool, melena and vomiting.   Genitourinary: Negative for dysuria and hematuria.   Skin: Negative for itching and rash.   Neurological: Negative for seizures and loss of consciousness.   Endo/Heme/Allergies: Positive for environmental allergies. Does not bruise/bleed easily.       Objective:   /80   Pulse 80   Ht 5' 8" (1.727 m)   Wt 92.2 kg (203 lb 4.2 oz)   SpO2 98%   BMI " 30.91 kg/m²       Physical Exam   Constitutional: He is oriented to person, place, and time and well-developed, well-nourished, and in no distress.   Not coughing   HENT:   Head: Normocephalic and atraumatic.   Nose: Nose normal.   Mouth/Throat: No oropharyngeal exudate.   Nares pink with mild to moderate turbinates swelling.  Oropharynx benign without exudate.  Tongue is not coated.   Eyes: Conjunctivae are normal. Right eye exhibits no discharge. Left eye exhibits no discharge.   Neck: Neck supple.   Cardiovascular: Normal rate, regular rhythm and intact distal pulses.   Pulmonary/Chest: Effort normal. No stridor. No respiratory distress.   Abdominal: He exhibits no distension.   Musculoskeletal: He exhibits no edema or deformity.   Lymphadenopathy:     He has no cervical adenopathy.   Neurological: He is alert and oriented to person, place, and time.   Skin: No rash noted. No erythema.   Psychiatric: Affect and judgment normal.       Data:   Aeroallergen testing by the serum Immunocap method (10/14/2019) show a low level reaction to cockroaces only   Class II cockroach        Assessment:     1. Cough    2. Chronic nonallergic rhinitis    3. Abnormal laboratory test result:  Immunocap to cockroach        Plan:     Medical decision making:  Client is presenting with cough at the level of his throat.  Differential is postnasal drip versus GERD/LPR.  I am not suspicious of any cardiopulmonary etiology. Based on his improvement with Zyrtec and his associated nasal congestion, postnasal drip seems somewhat more likely.  Since he dislikes medications, I initially offered him nasal rinse.  He ended up opting for topical antihistamine on a p.r.n. basis.        Will need to keep GERD/LPR high on the differential with a low threshold for ENT referral to consider laryngoscopy.    Diagnoses and all orders for this visit:    Cough - much improved  Results discussed and copy given  Continue Xyzal    Chronic nonallergic rhinitis  - much improved  Continue Xyzal    Abnormal laboratory test result:  Immunocap to cockroach  No intervention         Patient Instructions   Allergic only to cockroaches    Continue Zyrtec indefinitely.  Can be used every day or just when needed.    Return as needed      Follow up if symptoms worsen or fail to improve.    Penelope Rich MD

## 2019-10-31 ENCOUNTER — OFFICE VISIT (OUTPATIENT)
Dept: ALLERGY | Facility: CLINIC | Age: 34
End: 2019-10-31
Payer: COMMERCIAL

## 2019-10-31 VITALS
SYSTOLIC BLOOD PRESSURE: 110 MMHG | OXYGEN SATURATION: 98 % | HEART RATE: 80 BPM | HEIGHT: 68 IN | DIASTOLIC BLOOD PRESSURE: 80 MMHG | WEIGHT: 203.25 LBS | BODY MASS INDEX: 30.8 KG/M2

## 2019-10-31 DIAGNOSIS — R05.9 COUGH: Primary | ICD-10-CM

## 2019-10-31 DIAGNOSIS — J31.0 CHRONIC NONALLERGIC RHINITIS: ICD-10-CM

## 2019-10-31 DIAGNOSIS — R89.9 ABNORMAL LABORATORY TEST RESULT: ICD-10-CM

## 2019-10-31 PROCEDURE — 3008F PR BODY MASS INDEX (BMI) DOCUMENTED: ICD-10-PCS | Mod: CPTII,S$GLB,, | Performed by: STUDENT IN AN ORGANIZED HEALTH CARE EDUCATION/TRAINING PROGRAM

## 2019-10-31 PROCEDURE — 99214 OFFICE O/P EST MOD 30 MIN: CPT | Mod: S$GLB,,, | Performed by: STUDENT IN AN ORGANIZED HEALTH CARE EDUCATION/TRAINING PROGRAM

## 2019-10-31 PROCEDURE — 3008F BODY MASS INDEX DOCD: CPT | Mod: CPTII,S$GLB,, | Performed by: STUDENT IN AN ORGANIZED HEALTH CARE EDUCATION/TRAINING PROGRAM

## 2019-10-31 PROCEDURE — 99999 PR PBB SHADOW E&M-EST. PATIENT-LVL III: ICD-10-PCS | Mod: PBBFAC,,, | Performed by: STUDENT IN AN ORGANIZED HEALTH CARE EDUCATION/TRAINING PROGRAM

## 2019-10-31 PROCEDURE — 99999 PR PBB SHADOW E&M-EST. PATIENT-LVL III: CPT | Mod: PBBFAC,,, | Performed by: STUDENT IN AN ORGANIZED HEALTH CARE EDUCATION/TRAINING PROGRAM

## 2019-10-31 PROCEDURE — 99214 PR OFFICE/OUTPT VISIT, EST, LEVL IV, 30-39 MIN: ICD-10-PCS | Mod: S$GLB,,, | Performed by: STUDENT IN AN ORGANIZED HEALTH CARE EDUCATION/TRAINING PROGRAM

## 2019-10-31 NOTE — PATIENT INSTRUCTIONS
Allergic only to cockroaches    Continue Zyrtec indefinitely.  Can be used every day or just when needed.    Return as needed

## 2019-11-18 ENCOUNTER — OFFICE VISIT (OUTPATIENT)
Dept: OTOLARYNGOLOGY | Facility: CLINIC | Age: 34
End: 2019-11-18
Payer: COMMERCIAL

## 2019-11-18 VITALS — DIASTOLIC BLOOD PRESSURE: 88 MMHG | SYSTOLIC BLOOD PRESSURE: 132 MMHG | HEART RATE: 77 BPM

## 2019-11-18 DIAGNOSIS — J34.89 NASAL OBSTRUCTION: ICD-10-CM

## 2019-11-18 DIAGNOSIS — M95.0 NASAL DEFORMITY, ACQUIRED: ICD-10-CM

## 2019-11-18 DIAGNOSIS — Z01.818 PRE-OP TESTING: Primary | ICD-10-CM

## 2019-11-18 DIAGNOSIS — J34.3 NASAL TURBINATE HYPERTROPHY: ICD-10-CM

## 2019-11-18 DIAGNOSIS — J34.2 NASAL SEPTAL DEVIATION: ICD-10-CM

## 2019-11-18 PROCEDURE — 99204 PR OFFICE/OUTPT VISIT, NEW, LEVL IV, 45-59 MIN: ICD-10-PCS | Mod: S$GLB,,, | Performed by: OTOLARYNGOLOGY

## 2019-11-18 PROCEDURE — 99999 PR PBB SHADOW E&M-EST. PATIENT-LVL III: CPT | Mod: PBBFAC,,, | Performed by: OTOLARYNGOLOGY

## 2019-11-18 PROCEDURE — 99999 PR PBB SHADOW E&M-EST. PATIENT-LVL III: ICD-10-PCS | Mod: PBBFAC,,, | Performed by: OTOLARYNGOLOGY

## 2019-11-18 PROCEDURE — 99204 OFFICE O/P NEW MOD 45 MIN: CPT | Mod: S$GLB,,, | Performed by: OTOLARYNGOLOGY

## 2019-11-18 NOTE — PROGRESS NOTES
Mr. Zelaya presents for a consultation.     VITAL SIGNS:  Per nurses' notes.     CHIEF COMPLAINT:  Nasal obstruction.     HISTORY OF PRESENT ILLNESS:  This is a 34-year-old gentleman who presents with a   longstanding nasal obstruction, which has been getting worse over the years. I had seen him for identical complaints 7 years ago.  He has seen Allergy last month and placed on Flonase and Zyrtec. He states the Zyrtec helps him some but not the Flonase. He says he still can't breathe through his nose which is also causing him some difficulty sleeping at night.     REVIEW OF SYSTEMS:  CONSTITUTIONAL: weight loss or weight gain: Negative.  ALLERGY/IMMUNOLOGIC: Negative.  ENT/Mouth:  Hearing Loss/Dizziness/Tinnitus: Negative.  Ear Infections/Otalgia: Negative.  Rhinitis/Sinusitis/Epistaxis: Negative.  Headache/Facial Pain: Negative.  Nasal Obstruction/Snoring/PEPE: Positive nasal obstruction.  Throat: Infections/Pain: Negative.  Hoarseness/Speech Disturbance: Negative.  Salivary Glands Disorder: Negative.  Trauma:  Hx: Negative.     Cardiovascular:  M/I Angina: Negative.  Hypertension: Negative.  Endo: DM/Steroids: Negative.  Eyes: Negative.  GI: Dysphagia/Reflux: Negative.  : GYN Pregnancy: Negative.                Renal: Dialysis: Negative.  Lymph: Neck Mass/Lymphadenopathy: Negative.  Musculoskeletal: Negative.  Hem: Bleeding Disorders/Anemia: Negative.  Neuro: Cranial/Neuralgia: Negative.  Pulm: Asthma/SOB/Cough: Negative.  Skin/Breast: Negative.     PAST MEDICAL/FAMILY/SOCIAL HISTORY:     Past Medical History             ENT Surgery: Negative.             Occupational Exposure: Negative.              Problems: Negative.             Cancer: Negative.     Past Family History             Family history hearing loss: Negative.             Family history cancer: Negative.     Past Social History             Tobacco: Negative.             Alcohol: Negative.     MEDICATIONS:  Per Med Card.     ALLERGIES:  Per  Med Card.     PHYSICAL EXAMINATION:  General Appearance:  Well-developed, well-nourished 34-year-old male in no   apparent distress.  Communication Ability:  Good.  EARS, NOSE, THROAT, MOUTH;  EARS:             External auditory canals: clear             Hearing: Grossly Intact.             Tympanic membranes: Not visualized.  NOSE:             External: With a marked shift to the left.             Intranasal: Marked septal deviation to the left anteriorly and right posteriorly with 2+ turbinates, all together creating   approximately 90% obstruction.  MOUTH:             Intraorally: Lips, teeth and gums: Normal.             Oropharynx: Normal.             Mucosa: Normal.  THROAT:             Tongue: Normal.             Palate: Normal.             Tonsils: Minimum.             Posterior pharynx: Shows normal elevation of the palate and a small uvula.  HEAD/FACE INSPECTION: Normal and atraumatic.             Palpation/Percussion:  Non tender.             Facial Strength: Normal and symmetric.             Salivary glands: Normal.     NECK: Supple.  THYROID: No masses.  LYMPHATICS: No nodes.  RESPIRATORY:             Effort: Normal.  EYES:             Ocular Mobility: Normal.             Vision: Grossly intact.  NEURO/PSYCH:             Cranial nerves: 2-12 grossly intact.             Orientation: x3.             Mood/Affect: Normal.     IMPRESSION:  Bilateral cerumen impaction.     RECOMMENDATIONS:   Surgically, I   would recommend nasal reconstruction with osteotomies, bilateral    grafts, septoplasty and submucosal resection of turbinates.  He will schedule   this at his convenience.

## 2019-11-26 ENCOUNTER — TELEPHONE (OUTPATIENT)
Dept: OTOLARYNGOLOGY | Facility: CLINIC | Age: 34
End: 2019-11-26

## 2019-11-26 DIAGNOSIS — J34.3 NASAL TURBINATE HYPERTROPHY: ICD-10-CM

## 2019-11-26 DIAGNOSIS — J34.2 NASAL SEPTAL DEVIATION: ICD-10-CM

## 2019-11-26 DIAGNOSIS — M95.0 NASAL DEFORMITY, ACQUIRED: ICD-10-CM

## 2019-11-26 DIAGNOSIS — Z01.818 PRE-OP TESTING: Primary | ICD-10-CM

## 2019-11-26 DIAGNOSIS — J34.89 NASAL OBSTRUCTION: ICD-10-CM

## 2019-12-16 ENCOUNTER — TELEPHONE (OUTPATIENT)
Dept: OTOLARYNGOLOGY | Facility: CLINIC | Age: 34
End: 2019-12-16

## 2019-12-16 NOTE — TELEPHONE ENCOUNTER
Left message on voicemail for pt to call back when received message in regards to surgery with Dr. Holley that is scheduled for tomorrow 12/17/2019.